# Patient Record
Sex: FEMALE | Race: WHITE | NOT HISPANIC OR LATINO | Employment: UNEMPLOYED | URBAN - NONMETROPOLITAN AREA
[De-identification: names, ages, dates, MRNs, and addresses within clinical notes are randomized per-mention and may not be internally consistent; named-entity substitution may affect disease eponyms.]

---

## 2017-06-02 ENCOUNTER — DOCUMENTATION (OUTPATIENT)
Dept: CARDIOLOGY | Facility: CLINIC | Age: 62
End: 2017-06-02

## 2017-06-02 ENCOUNTER — PREP FOR SURGERY (OUTPATIENT)
Dept: OTHER | Facility: HOSPITAL | Age: 62
End: 2017-06-02

## 2017-06-02 ENCOUNTER — OFFICE VISIT (OUTPATIENT)
Dept: CARDIOLOGY | Facility: CLINIC | Age: 62
End: 2017-06-02

## 2017-06-02 VITALS
SYSTOLIC BLOOD PRESSURE: 138 MMHG | BODY MASS INDEX: 41.14 KG/M2 | DIASTOLIC BLOOD PRESSURE: 82 MMHG | HEIGHT: 66 IN | OXYGEN SATURATION: 98 % | HEART RATE: 104 BPM | WEIGHT: 256 LBS

## 2017-06-02 DIAGNOSIS — E78.00 PURE HYPERCHOLESTEROLEMIA: ICD-10-CM

## 2017-06-02 DIAGNOSIS — I20.9 ANGINA PECTORIS (HCC): ICD-10-CM

## 2017-06-02 DIAGNOSIS — I10 ESSENTIAL HYPERTENSION: Primary | ICD-10-CM

## 2017-06-02 DIAGNOSIS — I20.0 ANGINA PECTORIS, UNSTABLE (HCC): Primary | ICD-10-CM

## 2017-06-02 PROCEDURE — 99204 OFFICE O/P NEW MOD 45 MIN: CPT | Performed by: INTERNAL MEDICINE

## 2017-06-02 PROCEDURE — 93000 ELECTROCARDIOGRAM COMPLETE: CPT | Performed by: INTERNAL MEDICINE

## 2017-06-02 RX ORDER — ASPIRIN 325 MG
325 TABLET ORAL DAILY
Qty: 30 TABLET | Refills: 11 | COMMUNITY
End: 2017-06-05 | Stop reason: HOSPADM

## 2017-06-02 RX ORDER — SODIUM CHLORIDE 0.9 % (FLUSH) 0.9 %
1-10 SYRINGE (ML) INJECTION AS NEEDED
Status: CANCELLED | OUTPATIENT
Start: 2017-06-05

## 2017-06-02 RX ORDER — ATORVASTATIN CALCIUM 20 MG/1
20 TABLET, FILM COATED ORAL DAILY
Qty: 30 TABLET | Refills: 11 | Status: SHIPPED | OUTPATIENT
Start: 2017-06-02

## 2017-06-02 RX ORDER — SODIUM CHLORIDE 9 MG/ML
80 INJECTION, SOLUTION INTRAVENOUS CONTINUOUS
Status: CANCELLED | OUTPATIENT
Start: 2017-06-05

## 2017-06-02 NOTE — PROGRESS NOTES
[facility]  OFFICE NOTE    Chasity Contreras  61 y.o. female    06/02/2017  1. Essential hypertension    2. Pure hypercholesterolemia    3. Angina pectoris        Chief complaint -Precordial chest pain radiating to neck and jaw and teeth      History of present Illness- 61-year-old lady who has been a diabetic for 3 years her most recent A1c was 10.5 has been having chest tightness radiating to the left arm with tingling and numbness and also to the jaw and to the teeth.  She does not have any cavities in the teeth.  She has been having a couple of episodes for the past 2 weeks and it is happening at rest.  She has not been taking aspirin.  She had questionable intolerance 40 years ago which made her heart rate going fast when she took aspirin.  I asked her to take an aspirin today.  If she starts having problems then stop it.  I discussed option of cardiac catheterization extreme risk of bleeding, stroke, heart attack and even death.  Patient consented patient is ASA class II, Mallamaptti level II patient consented for conscious sedation              Allergies   Allergen Reactions   • Aspirin    • Penicillins          Past Medical History:   Diagnosis Date   • Diabetes mellitus    • Hypertension    • Vertigo          Past Surgical History:   Procedure Laterality Date   • HYSTERECTOMY     • KNEE SURGERY           Family History   Problem Relation Age of Onset   • Heart disease Mother    • Hypertension Father          Social History     Social History   • Marital status:      Spouse name: N/A   • Number of children: N/A   • Years of education: N/A     Occupational History   • Not on file.     Social History Main Topics   • Smoking status: Never Smoker   • Smokeless tobacco: Never Used   • Alcohol use No   • Drug use: No   • Sexual activity: Defer     Other Topics Concern   • Not on file     Social History Narrative         Current Outpatient Prescriptions   Medication Sig Dispense  "Refill   • aspirin 325 MG tablet Take 1 tablet by mouth Daily. 30 tablet 11   • atorvastatin (LIPITOR) 20 MG tablet Take 1 tablet by mouth Daily. 30 tablet 11   • glyBURIDE (DIAbeta) 5 MG tablet Take 5 mg by mouth Daily With Breakfast.     • losartan-hydrochlorothiazide (HYZAAR) 100-25 MG per tablet Take 1 tablet by mouth Daily.     • metFORMIN (GLUCOPHAGE) 500 MG tablet Take 500 mg by mouth 2 (Two) Times a Day With Meals.       No current facility-administered medications for this visit.          Review of Systems     Constitution: Denies any fatigue, fever or chills    HENT: Denies any headache, hearing impairment,     Eyes: Denies any blurring of vision, or photophobia     Cardivascular - As per history of present illness     Respiratory system-denies any COPD, shortness of breath,   sleep apnea.     Endocrine:   history of hyperlipidemia, diabetes,                             Musculoskeletal:  No history of arthritis with musculoskeletal problems    Gastrointestinal: No nausea, vomiting, or melena    Genitourinary: No dysuria or hematuria    Neurological:   No history of seizure disorder, stroke, memory problems    Psychiatric/Behavioral:        No history of depression,  No history of bipolar disorder or schizophrenia     Hematological- no history of easy bruising or any bleeding diathesis            OBJECTIVE    /82  Pulse 104  Ht 66\" (167.6 cm)  Wt 256 lb (116 kg)  SpO2 98%  BMI 41.32 kg/m2      Physical Exam     Constitutional: is oriented to person, place, and time.     Skin-warm and dry    Well developed and nourished in no acute distress      Head: Normocephalic and atraumatic.     Eyes: Pupils are equal, round, and reactive to light.     Neck: Neck supple. No bruit in the carotids, no elevation of JVD    Cardiovascular: [place] in the fifth intercostal space   Regular rate, and  Rhythm,    S1 greater than S2, no S3 or S4, no gallop     Pulmonary/Chest:   Air  Entry is equal on both sides  No " wheezing or crackles,      Abdominal: Soft.  No hepatosplenomegaly, bowel sounds are present    Musculoskeletal: No kyphoscoliosis, no significant thickening of the joints    Neurological: is alert and oriented to person, place, and time.    cranial nerve are intact .   No motor or sensory deficit    Extremities-no edema, no radial femoral delay      Psychiatric: He has a normal mood and affect.                  His behavior is normal.             ECG 12 Lead  Date/Time: 6/2/2017 9:04 AM  Performed by: AARON CORTES  Authorized by: AARON CORTES   Comparison: not compared with previous ECG   Rhythm: sinus rhythm  Comments: Sinus rhythm low voltage, nonprogression of R-wave in V4 to V6 with small Q in lead 3              Lab Results   Component Value Date    WBC 10.9 (H) 04/14/2016    HGB 15.7 (H) 04/14/2016    HCT 45.9 (H) 04/14/2016    MCV 87.8 04/14/2016     04/14/2016     Lab Results   Component Value Date    GLUCOSE 231 (H) 04/14/2016    BUN 18 04/14/2016    CREATININE 0.7 04/14/2016    CO2 27 04/14/2016    CALCIUM 9.2 04/14/2016    ALBUMIN 4.5 04/14/2016    AST 49 (H) 04/14/2016    ALT 57 (H) 04/14/2016                  A/P    Unstable angina-with risk factors of diabetes, BMI of 41, hypertension with EKG showing sinus rhythm with the nonprogression of R-wave in V4 to V6 with recurrence of symptoms started on aspirin, nitrates and statins.  Schedule for cardiac catheterization Monday afternoon.  Explained the risk of bleeding stroke heart attack and patient consented.    Diabetes poorly controlled, A1c is 10.5 and she is getting hypoglycemia with the glyburide and the metformin has been on hold for 2 days prior to the cardiac catheterization will have to start her on new medications at a lower dose.    Hypertension controlled with Hyzaar.     spent 40 minutes                This document has been electronically signed by Aaron Cortes MD on June 2, 2017 9:01 AM       EMR  Dragon/Transcription disclaimer:   Some of this note may be an electronic transcription/translation of spoken language to printed text. The electronic translation of spoken language may permit erroneous, or at times, nonsensical words or phrases to be inadvertently transcribed; Although I have reviewed the note for such errors, some may still exist.

## 2017-06-02 NOTE — PROGRESS NOTES
Pt scheduled for heart cath on 6/5/17 at 2pm. Instructions given to pt with verbalization of understanding. Pt to have a light breakfast before 8am, take b/p medication, and stop metformin 2 days before procedure.

## 2017-06-05 ENCOUNTER — HOSPITAL ENCOUNTER (OUTPATIENT)
Facility: HOSPITAL | Age: 62
Setting detail: HOSPITAL OUTPATIENT SURGERY
Discharge: HOME | End: 2017-06-05
Attending: INTERNAL MEDICINE | Admitting: INTERNAL MEDICINE

## 2017-06-05 VITALS
SYSTOLIC BLOOD PRESSURE: 122 MMHG | WEIGHT: 256.62 LBS | DIASTOLIC BLOOD PRESSURE: 70 MMHG | OXYGEN SATURATION: 94 % | HEIGHT: 66 IN | BODY MASS INDEX: 41.24 KG/M2 | RESPIRATION RATE: 18 BRPM | TEMPERATURE: 98 F | HEART RATE: 88 BPM

## 2017-06-05 DIAGNOSIS — I20.0 ANGINA PECTORIS, UNSTABLE (HCC): ICD-10-CM

## 2017-06-05 LAB
ALBUMIN SERPL-MCNC: 4.1 G/DL (ref 3.4–4.8)
ALBUMIN/GLOB SERPL: 1.4 G/DL (ref 1.1–1.8)
ALP SERPL-CCNC: 57 U/L (ref 38–126)
ALT SERPL W P-5'-P-CCNC: 74 U/L (ref 9–52)
ANION GAP SERPL CALCULATED.3IONS-SCNC: 10 MMOL/L (ref 5–15)
AST SERPL-CCNC: 34 U/L (ref 14–36)
BILIRUB SERPL-MCNC: 0.4 MG/DL (ref 0.2–1.3)
BUN BLD-MCNC: 30 MG/DL (ref 7–21)
BUN/CREAT SERPL: 30 (ref 7–25)
CALCIUM SPEC-SCNC: 9 MG/DL (ref 8.4–10.2)
CHLORIDE SERPL-SCNC: 103 MMOL/L (ref 95–110)
CO2 SERPL-SCNC: 25 MMOL/L (ref 22–31)
CREAT BLD-MCNC: 1 MG/DL (ref 0.5–1)
DEPRECATED RDW RBC AUTO: 40.8 FL (ref 36.4–46.3)
ERYTHROCYTE [DISTWIDTH] IN BLOOD BY AUTOMATED COUNT: 12.7 % (ref 11.5–14.5)
GFR SERPL CREATININE-BSD FRML MDRD: 56 ML/MIN/1.73 (ref 60–104)
GLOBULIN UR ELPH-MCNC: 3 GM/DL (ref 2.3–3.5)
GLUCOSE BLD-MCNC: 190 MG/DL (ref 60–100)
HCT VFR BLD AUTO: 41.6 % (ref 35–45)
HGB BLD-MCNC: 13.7 G/DL (ref 12–15.5)
INR PPP: 0.96 (ref 0.8–1.2)
MCH RBC QN AUTO: 28.8 PG (ref 26.5–34)
MCHC RBC AUTO-ENTMCNC: 32.9 G/DL (ref 31.4–36)
MCV RBC AUTO: 87.6 FL (ref 80–98)
PLATELET # BLD AUTO: 310 10*3/MM3 (ref 150–450)
PMV BLD AUTO: 10.6 FL (ref 8–12)
POTASSIUM BLD-SCNC: 3.8 MMOL/L (ref 3.5–5.1)
PROT SERPL-MCNC: 7.1 G/DL (ref 6.3–8.6)
PROTHROMBIN TIME: 12.7 SECONDS (ref 11.1–15.3)
RBC # BLD AUTO: 4.75 10*6/MM3 (ref 3.77–5.16)
SODIUM BLD-SCNC: 138 MMOL/L (ref 137–145)
WBC NRBC COR # BLD: 8.56 10*3/MM3 (ref 3.2–9.8)

## 2017-06-05 PROCEDURE — 25010000002 MIDAZOLAM PER 1 MG: Performed by: INTERNAL MEDICINE

## 2017-06-05 PROCEDURE — 0 IOPAMIDOL PER 1 ML: Performed by: INTERNAL MEDICINE

## 2017-06-05 PROCEDURE — 93458 L HRT ARTERY/VENTRICLE ANGIO: CPT | Performed by: INTERNAL MEDICINE

## 2017-06-05 PROCEDURE — C1894 INTRO/SHEATH, NON-LASER: HCPCS | Performed by: INTERNAL MEDICINE

## 2017-06-05 PROCEDURE — C1760 CLOSURE DEV, VASC: HCPCS | Performed by: INTERNAL MEDICINE

## 2017-06-05 PROCEDURE — 25010000003 CEFAZOLIN PER 500 MG: Performed by: INTERNAL MEDICINE

## 2017-06-05 PROCEDURE — 25010000002 FENTANYL CITRATE (PF) 100 MCG/2ML SOLUTION: Performed by: INTERNAL MEDICINE

## 2017-06-05 PROCEDURE — 85027 COMPLETE CBC AUTOMATED: CPT | Performed by: INTERNAL MEDICINE

## 2017-06-05 PROCEDURE — 85610 PROTHROMBIN TIME: CPT | Performed by: INTERNAL MEDICINE

## 2017-06-05 PROCEDURE — 80053 COMPREHEN METABOLIC PANEL: CPT | Performed by: INTERNAL MEDICINE

## 2017-06-05 PROCEDURE — C1769 GUIDE WIRE: HCPCS | Performed by: INTERNAL MEDICINE

## 2017-06-05 RX ORDER — LIDOCAINE HYDROCHLORIDE 20 MG/ML
INJECTION, SOLUTION INFILTRATION; PERINEURAL AS NEEDED
Status: DISCONTINUED | OUTPATIENT
Start: 2017-06-05 | End: 2017-06-05 | Stop reason: HOSPADM

## 2017-06-05 RX ORDER — METOPROLOL TARTRATE 5 MG/5ML
INJECTION INTRAVENOUS AS NEEDED
Status: DISCONTINUED | OUTPATIENT
Start: 2017-06-05 | End: 2017-06-05 | Stop reason: HOSPADM

## 2017-06-05 RX ORDER — ONDANSETRON 4 MG/1
4 TABLET, ORALLY DISINTEGRATING ORAL EVERY 6 HOURS PRN
Status: DISCONTINUED | OUTPATIENT
Start: 2017-06-05 | End: 2017-06-05 | Stop reason: HOSPADM

## 2017-06-05 RX ORDER — MIDAZOLAM HYDROCHLORIDE 1 MG/ML
INJECTION INTRAMUSCULAR; INTRAVENOUS AS NEEDED
Status: DISCONTINUED | OUTPATIENT
Start: 2017-06-05 | End: 2017-06-05 | Stop reason: HOSPADM

## 2017-06-05 RX ORDER — SODIUM CHLORIDE 0.9 % (FLUSH) 0.9 %
1-10 SYRINGE (ML) INJECTION AS NEEDED
Status: DISCONTINUED | OUTPATIENT
Start: 2017-06-05 | End: 2017-06-05 | Stop reason: HOSPADM

## 2017-06-05 RX ORDER — ONDANSETRON 2 MG/ML
4 INJECTION INTRAMUSCULAR; INTRAVENOUS EVERY 6 HOURS PRN
Status: DISCONTINUED | OUTPATIENT
Start: 2017-06-05 | End: 2017-06-05 | Stop reason: HOSPADM

## 2017-06-05 RX ORDER — SODIUM CHLORIDE 9 MG/ML
80 INJECTION, SOLUTION INTRAVENOUS CONTINUOUS
Status: DISCONTINUED | OUTPATIENT
Start: 2017-06-05 | End: 2017-06-05 | Stop reason: HOSPADM

## 2017-06-05 RX ORDER — ONDANSETRON 4 MG/1
4 TABLET, FILM COATED ORAL EVERY 6 HOURS PRN
Status: DISCONTINUED | OUTPATIENT
Start: 2017-06-05 | End: 2017-06-05 | Stop reason: HOSPADM

## 2017-06-05 RX ORDER — FENTANYL CITRATE 50 UG/ML
INJECTION, SOLUTION INTRAMUSCULAR; INTRAVENOUS AS NEEDED
Status: DISCONTINUED | OUTPATIENT
Start: 2017-06-05 | End: 2017-06-05 | Stop reason: HOSPADM

## 2017-06-05 RX ORDER — SODIUM CHLORIDE 9 MG/ML
100 INJECTION, SOLUTION INTRAVENOUS CONTINUOUS
Status: DISCONTINUED | OUTPATIENT
Start: 2017-06-05 | End: 2017-06-05 | Stop reason: HOSPADM

## 2017-06-05 RX ADMIN — SODIUM CHLORIDE 80 ML/HR: 9 INJECTION, SOLUTION INTRAVENOUS at 12:34

## 2017-06-05 NOTE — DISCHARGE INSTRUCTIONS
Remove dressing in 24 hours, may shower once dressing is removed.    Do not soak in a bath tub, hot tub, or swimming pool for 2 weeks.

## 2017-06-05 NOTE — H&P (VIEW-ONLY)
[facility]  OFFICE NOTE    Chasity Contreras  61 y.o. female    06/02/2017  1. Essential hypertension    2. Pure hypercholesterolemia    3. Angina pectoris        Chief complaint -Precordial chest pain radiating to neck and jaw and teeth      History of present Illness- 61-year-old lady who has been a diabetic for 3 years her most recent A1c was 10.5 has been having chest tightness radiating to the left arm with tingling and numbness and also to the jaw and to the teeth.  She does not have any cavities in the teeth.  She has been having a couple of episodes for the past 2 weeks and it is happening at rest.  She has not been taking aspirin.  She had questionable intolerance 40 years ago which made her heart rate going fast when she took aspirin.  I asked her to take an aspirin today.  If she starts having problems then stop it.  I discussed option of cardiac catheterization extreme risk of bleeding, stroke, heart attack and even death.  Patient consented patient is ASA class II, Mallamaptti level II patient consented for conscious sedation              Allergies   Allergen Reactions   • Aspirin    • Penicillins          Past Medical History:   Diagnosis Date   • Diabetes mellitus    • Hypertension    • Vertigo          Past Surgical History:   Procedure Laterality Date   • HYSTERECTOMY     • KNEE SURGERY           Family History   Problem Relation Age of Onset   • Heart disease Mother    • Hypertension Father          Social History     Social History   • Marital status:      Spouse name: N/A   • Number of children: N/A   • Years of education: N/A     Occupational History   • Not on file.     Social History Main Topics   • Smoking status: Never Smoker   • Smokeless tobacco: Never Used   • Alcohol use No   • Drug use: No   • Sexual activity: Defer     Other Topics Concern   • Not on file     Social History Narrative         Current Outpatient Prescriptions   Medication Sig Dispense  "Refill   • aspirin 325 MG tablet Take 1 tablet by mouth Daily. 30 tablet 11   • atorvastatin (LIPITOR) 20 MG tablet Take 1 tablet by mouth Daily. 30 tablet 11   • glyBURIDE (DIAbeta) 5 MG tablet Take 5 mg by mouth Daily With Breakfast.     • losartan-hydrochlorothiazide (HYZAAR) 100-25 MG per tablet Take 1 tablet by mouth Daily.     • metFORMIN (GLUCOPHAGE) 500 MG tablet Take 500 mg by mouth 2 (Two) Times a Day With Meals.       No current facility-administered medications for this visit.          Review of Systems     Constitution: Denies any fatigue, fever or chills    HENT: Denies any headache, hearing impairment,     Eyes: Denies any blurring of vision, or photophobia     Cardivascular - As per history of present illness     Respiratory system-denies any COPD, shortness of breath,   sleep apnea.     Endocrine:   history of hyperlipidemia, diabetes,                             Musculoskeletal:  No history of arthritis with musculoskeletal problems    Gastrointestinal: No nausea, vomiting, or melena    Genitourinary: No dysuria or hematuria    Neurological:   No history of seizure disorder, stroke, memory problems    Psychiatric/Behavioral:        No history of depression,  No history of bipolar disorder or schizophrenia     Hematological- no history of easy bruising or any bleeding diathesis            OBJECTIVE    /82  Pulse 104  Ht 66\" (167.6 cm)  Wt 256 lb (116 kg)  SpO2 98%  BMI 41.32 kg/m2      Physical Exam     Constitutional: is oriented to person, place, and time.     Skin-warm and dry    Well developed and nourished in no acute distress      Head: Normocephalic and atraumatic.     Eyes: Pupils are equal, round, and reactive to light.     Neck: Neck supple. No bruit in the carotids, no elevation of JVD    Cardiovascular: [place] in the fifth intercostal space   Regular rate, and  Rhythm,    S1 greater than S2, no S3 or S4, no gallop     Pulmonary/Chest:   Air  Entry is equal on both sides  No " wheezing or crackles,      Abdominal: Soft.  No hepatosplenomegaly, bowel sounds are present    Musculoskeletal: No kyphoscoliosis, no significant thickening of the joints    Neurological: is alert and oriented to person, place, and time.    cranial nerve are intact .   No motor or sensory deficit    Extremities-no edema, no radial femoral delay      Psychiatric: He has a normal mood and affect.                  His behavior is normal.             ECG 12 Lead  Date/Time: 6/2/2017 9:04 AM  Performed by: AARON CORTES  Authorized by: AARON CORTES   Comparison: not compared with previous ECG   Rhythm: sinus rhythm  Comments: Sinus rhythm low voltage, nonprogression of R-wave in V4 to V6 with small Q in lead 3              Lab Results   Component Value Date    WBC 10.9 (H) 04/14/2016    HGB 15.7 (H) 04/14/2016    HCT 45.9 (H) 04/14/2016    MCV 87.8 04/14/2016     04/14/2016     Lab Results   Component Value Date    GLUCOSE 231 (H) 04/14/2016    BUN 18 04/14/2016    CREATININE 0.7 04/14/2016    CO2 27 04/14/2016    CALCIUM 9.2 04/14/2016    ALBUMIN 4.5 04/14/2016    AST 49 (H) 04/14/2016    ALT 57 (H) 04/14/2016                  A/P    Unstable angina-with risk factors of diabetes, BMI of 41, hypertension with EKG showing sinus rhythm with the nonprogression of R-wave in V4 to V6 with recurrence of symptoms started on aspirin, nitrates and statins.  Schedule for cardiac catheterization Monday afternoon.  Explained the risk of bleeding stroke heart attack and patient consented.    Diabetes poorly controlled, A1c is 10.5 and she is getting hypoglycemia with the glyburide and the metformin has been on hold for 2 days prior to the cardiac catheterization will have to start her on new medications at a lower dose.    Hypertension controlled with Hyzaar.     spent 40 minutes                This document has been electronically signed by Aaron Cortes MD on June 2, 2017 9:01 AM       EMR  Dragon/Transcription disclaimer:   Some of this note may be an electronic transcription/translation of spoken language to printed text. The electronic translation of spoken language may permit erroneous, or at times, nonsensical words or phrases to be inadvertently transcribed; Although I have reviewed the note for such errors, some may still exist.

## 2017-07-05 ENCOUNTER — OFFICE VISIT (OUTPATIENT)
Dept: CARDIOLOGY | Facility: CLINIC | Age: 62
End: 2017-07-05

## 2017-07-05 VITALS
WEIGHT: 258 LBS | DIASTOLIC BLOOD PRESSURE: 80 MMHG | HEIGHT: 66 IN | BODY MASS INDEX: 41.46 KG/M2 | SYSTOLIC BLOOD PRESSURE: 128 MMHG | HEART RATE: 100 BPM

## 2017-07-05 DIAGNOSIS — E11.9 TYPE 2 DIABETES MELLITUS WITHOUT COMPLICATION, WITHOUT LONG-TERM CURRENT USE OF INSULIN (HCC): Primary | ICD-10-CM

## 2017-07-05 DIAGNOSIS — I10 ESSENTIAL HYPERTENSION: ICD-10-CM

## 2017-07-05 DIAGNOSIS — E78.00 PURE HYPERCHOLESTEROLEMIA: ICD-10-CM

## 2017-07-05 PROCEDURE — 99212 OFFICE O/P EST SF 10 MIN: CPT | Performed by: INTERNAL MEDICINE

## 2017-07-05 RX ORDER — DILTIAZEM HYDROCHLORIDE 180 MG/1
180 CAPSULE, COATED, EXTENDED RELEASE ORAL DAILY
COMMUNITY

## 2017-07-05 NOTE — PROGRESS NOTES
[facility]  OFFICE PROGRESS NOTE    Name: Chasity Contreras  Age/Sex: 61 y.o. female  :  1955        PCP: AMANUEL Rodriguez    Vital Signs  Heart Rate:  [100] 100  BP: (128)/(80) 128/80  Body mass index is 41.64 kg/(m^2).      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  Reassured her doctor about diet and exercise and losing weight and better control of her sugars.      Patient Active Problem List   Diagnosis   • Vertigo   • Essential hypertension   • Pure hypercholesterolemia   • Angina pectoris   • Type 2 diabetes mellitus without complication, without long-term current use of insulin       Past Medical History:   Diagnosis Date   • Diabetes mellitus    • Hypertension    • Vertigo        Current Outpatient Prescriptions   Medication Sig Dispense Refill   • atorvastatin (LIPITOR) 20 MG tablet Take 1 tablet by mouth Daily. 30 tablet 11   • diltiaZEM CD (CARDIZEM CD) 180 MG 24 hr capsule Take 180 mg by mouth Daily.     • glyBURIDE (DIAbeta) 5 MG tablet Take 5 mg by mouth Daily With Breakfast.     • losartan-hydrochlorothiazide (HYZAAR) 100-25 MG per tablet Take 1 tablet by mouth Daily.     • metFORMIN (GLUCOPHAGE) 500 MG tablet Take 500 mg by mouth 2 (Two) Times a Day With Meals.       No current facility-administered medications for this visit.        Past Surgical History:   Procedure Laterality Date   • CARDIAC CATHETERIZATION N/A 2017    Procedure: Coronary angiography;  Surgeon: Duarte Thompson MD;  Location: [facility] INVASIVE LOCATION;  Service:    • DILATION AND CURETTAGE, DIAGNOSTIC / THERAPEUTIC     • HYSTERECTOMY     • KNEE SURGERY     • MENISCECTOMY         Social History     Social History   • Marital status:       Spouse name: N/A   • Number of children: N/A   • Years of education: N/A     Occupational History   • Not on file.     Social History Main Topics   • Smoking status: Never Smoker   • Smokeless tobacco: Never Used   • Alcohol use No   • Drug use: No   • Sexual activity: Defer     Other Topics Concern   • Not on file     Social History Narrative       O/E    Neck supple  no Jvd , no thyroid enlargement  Chest air entry equal, normal respiration   no rhonchi or creps  Cardiovascular system regular rate and rythem , no murmurs  Abdomen soft no tenderness, bowel sounds present, no hepatosplenomegaly.  CNS - alert , oriented to place and time  No motor or sensory deficit.  Cranial nerves intact  musculoskeletal no deformity of the back or spine   Extremeties  no edema ,   pulses equal on both side    Admission on 06/05/2017, Discharged on 06/05/2017   Component Date Value Ref Range Status   • WBC 06/05/2017 8.56  3.20 - 9.80 10*3/mm3 Final   • RBC 06/05/2017 4.75  3.77 - 5.16 10*6/mm3 Final   • Hemoglobin 06/05/2017 13.7  12.0 - 15.5 g/dL Final   • Hematocrit 06/05/2017 41.6  35.0 - 45.0 % Final   • MCV 06/05/2017 87.6  80.0 - 98.0 fL Final   • MCH 06/05/2017 28.8  26.5 - 34.0 pg Final   • MCHC 06/05/2017 32.9  31.4 - 36.0 g/dL Final   • RDW 06/05/2017 12.7  11.5 - 14.5 % Final   • RDW-SD 06/05/2017 40.8  36.4 - 46.3 fl Final   • MPV 06/05/2017 10.6  8.0 - 12.0 fL Final   • Platelets 06/05/2017 310  150 - 450 10*3/mm3 Final   • Glucose 06/05/2017 190* 60 - 100 mg/dL Final   • BUN 06/05/2017 30* 7 - 21 mg/dL Final   • Creatinine 06/05/2017 1.00  0.50 - 1.00 mg/dL Final   • Sodium 06/05/2017 138  137 - 145 mmol/L Final   • Potassium 06/05/2017 3.8  3.5 - 5.1 mmol/L Final   • Chloride 06/05/2017 103  95 - 110 mmol/L Final   • CO2 06/05/2017 25.0  22.0 - 31.0 mmol/L Final   • Calcium 06/05/2017 9.0  8.4 - 10.2 mg/dL Final   • Total Protein 06/05/2017 7.1  6.3 - 8.6 g/dL Final   • Albumin 06/05/2017 4.10  3.40 - 4.80 g/dL  Final   • ALT (SGPT) 06/05/2017 74* 9 - 52 U/L Final   • AST (SGOT) 06/05/2017 34  14 - 36 U/L Final   • Alkaline Phosphatase 06/05/2017 57  38 - 126 U/L Final   • Total Bilirubin 06/05/2017 0.4  0.2 - 1.3 mg/dL Final   • eGFR Non  Amer 06/05/2017 56* >60 mL/min/1.73 Final   • Globulin 06/05/2017 3.0  2.3 - 3.5 gm/dL Final   • A/G Ratio 06/05/2017 1.4  1.1 - 1.8 g/dL Final   • BUN/Creatinine Ratio 06/05/2017 30.0* 7.0 - 25.0 Final   • Anion Gap 06/05/2017 10.0  5.0 - 15.0 mmol/L Final   • Protime 06/05/2017 12.7  11.1 - 15.3 Seconds Final   • INR 06/05/2017 0.96  0.80 - 1.20 Final   ]    Procedures          A/P    Atypical chest pain-no CAD by cardiac catheterization reassured her, was factor modification with diabetes controlled, hypertension and hyperlipidemia controlled.    [id number] necessary            This document has been electronically signed by Duarte Thompson MD on July 5, 2017 1:16 PM         EMR Dragon/Transcription disclaimer:   Some of this note may be an electronic transcription/translation of spoken language to printed text. The electronic translation of spoken language may permit erroneous, or at times, nonsensical words or phrases to be inadvertently transcribed; Although I have reviewed the note for such errors, some may still exist.

## 2020-07-16 ENCOUNTER — NURSE TRIAGE (OUTPATIENT)
Dept: CALL CENTER | Facility: HOSPITAL | Age: 65
End: 2020-07-16

## 2020-07-16 NOTE — TELEPHONE ENCOUNTER
Going to urgent care.    Reason for Disposition  • Weak immune system (e.g., HIV positive, cancer chemo, splenectomy, organ transplant, chronic steroids)    Additional Information  • Negative: Shock suspected (e.g., cold/pale/clammy skin, too weak to stand, low BP, rapid pulse)  • Negative: Difficult to awaken or acting confused (e.g., disoriented, slurred speech)  • Negative: Sounds like a life-threatening emergency to the triager  • Negative: Vomiting also present and worse than the diarrhea  • Negative: [1] Blood in stool AND [2] without diarrhea  • Negative: Diarrhea in a cancer patient who is currently (or recently) receiving chemotherapy or radiation therapy, or cancer patient who has metastatic or end-stage cancer and is receiving palliative care  • Negative: [1] SEVERE abdominal pain (e.g., excruciating) AND [2] present > 1 hour  • Negative: [1] SEVERE abdominal pain AND [2] age > 60  • Negative: [1] Blood in the stool AND [2] moderate or large amount of blood  • Negative: Black or tarry bowel movements  (Exception: chronic-unchanged  black-grey bowel movements AND is taking iron pills or Pepto-bismol)  • Negative: [1] Drinking very little AND [2] dehydration suspected (e.g., no urine > 12 hours, very dry mouth, very lightheaded)  • Negative: Patient sounds very sick or weak to the triager  • Negative: [1] SEVERE diarrhea (e.g., 7 or more times / day more than normal) AND [2] age > 60 years  • Negative: [1] Constant abdominal pain AND [2] present > 2 hours  • Negative: [1] Fever > 103 F (39.4 C) AND [2] not able to get the fever down using Fever Care Advice  • Negative: [1] SEVERE diarrhea (e.g., 7 or more times / day more than normal) AND [2] present > 24 hours (1 day)  • Negative: [1] MODERATE diarrhea (e.g., 4-6 times / day more than normal) AND [2] present > 48 hours (2 days)  • Negative: [1] MODERATE diarrhea (e.g., 4-6 times / day more than normal) AND [2] age > 70 years  • Negative: Fever > 101 F  "(38.3 C)  • Negative: Fever present > 3 days (72 hours)  • Negative: Abdominal pain  (Exception: Pain clears with each passage of diarrhea stool)  • Negative: [1] Blood in the stool AND [2] small amount of blood   (Exception: only on toilet paper. Reason: diarrhea can cause rectal irritation with blood on wiping)  • Negative: [1] Mucus or pus in stool AND [2] present > 2 days AND [3] diarrhea is more than mild  • Negative: [1] Recent antibiotic therapy (i.e., within last 2 months) AND [2] diarrhea present > 3 days since antibiotic was stopped  • Negative: [1] Recent hospitalization AND [2] diarrhea present > 3 days    Answer Assessment - Initial Assessment Questions  1. DIARRHEA SEVERITY: \"How bad is the diarrhea?\" \"How many extra stools have you had in the past 24 hours than normal?\"     - NO DIARRHEA (SCALE 0)    - MILD (SCALE 1-3): Few loose or mushy BMs; increase of 1-3 stools over normal daily number of stools; mild increase in ostomy output.    -  MODERATE (SCALE 4-7): Increase of 4-6 stools daily over normal; moderate increase in ostomy output.  * SEVERE (SCALE 8-10; OR 'WORST POSSIBLE'): Increase of 7 or more stools daily over normal; moderate increase in ostomy output; incontinence.  mild  2. ONSET: \"When did the diarrhea begin?\"       Since was quarantine  3. BM CONSISTENCY: \"How loose or watery is the diarrhea?\"       Watery and lose  4. VOMITING: \"Are you also vomiting?\" If so, ask: \"How many times in the past 24 hours?\"       no  5. ABDOMINAL PAIN: \"Are you having any abdominal pain?\" If yes: \"What does it feel like?\" (e.g., crampy, dull, intermittent, constant)       curry  6. ABDOMINAL PAIN SEVERITY: If present, ask: \"How bad is the pain?\"  (e.g., Scale 1-10; mild, moderate, or severe)    - MILD (1-3): doesn't interfere with normal activities, abdomen soft and not tender to touch     - MODERATE (4-7): interferes with normal activities or awakens from sleep, tender to touch     - SEVERE (8-10): " "excruciating pain, doubled over, unable to do any normal activities        no  7. ORAL INTAKE: If vomiting, \"Have you been able to drink liquids?\" \"How much fluids have you had in the past 24 hours?\"      Able to drink  8. HYDRATION: \"Any signs of dehydration?\" (e.g., dry mouth [not just dry lips], too weak to stand, dizziness, new weight loss) \"When did you last urinate?\"      Think may be dehydrated, 9-10 # weight loss  9. EXPOSURE: \"Have you traveled to a foreign country recently?\" \"Have you been exposed to anyone with diarrhea?\" \"Could you have eaten any food that was spoiled?\"      no  10. ANTIBIOTIC USE: \"Are you taking antibiotics now or have you taken antibiotics in the past 2 months?\"        no  11. OTHER SYMPTOMS: \"Do you have any other symptoms?\" (e.g., fever, blood in stool)        COVID +  12. PREGNANCY: \"Is there any chance you are pregnant?\" \"When was your last menstrual period?\"        na    Protocols used: DIARRHEA-ADULT-AH      "

## 2021-03-30 ENCOUNTER — IMMUNIZATION (OUTPATIENT)
Dept: VACCINE CLINIC | Facility: HOSPITAL | Age: 66
End: 2021-03-30

## 2021-03-30 PROCEDURE — 91300 HC SARSCOV02 VAC 30MCG/0.3ML IM: CPT | Performed by: NURSE PRACTITIONER

## 2021-03-30 PROCEDURE — 0001A: CPT | Performed by: NURSE PRACTITIONER

## 2021-04-20 ENCOUNTER — IMMUNIZATION (OUTPATIENT)
Dept: VACCINE CLINIC | Facility: HOSPITAL | Age: 66
End: 2021-04-20

## 2021-04-20 PROCEDURE — 0002A: CPT | Performed by: THORACIC SURGERY (CARDIOTHORACIC VASCULAR SURGERY)

## 2021-04-20 PROCEDURE — 91300 HC SARSCOV02 VAC 30MCG/0.3ML IM: CPT | Performed by: THORACIC SURGERY (CARDIOTHORACIC VASCULAR SURGERY)

## (undated) DEVICE — ANGIO-SEAL VIP VASCULAR CLOSURE DEVICE: Brand: ANGIO-SEAL

## (undated) DEVICE — ELECTRODE,RT,STRESS,FOAM,50PK: Brand: MEDLINE

## (undated) DEVICE — PK CATH LAB 60

## (undated) DEVICE — ST CVR PROB PULLUP ULTRASND 5X48IN

## (undated) DEVICE — CATH F6 ST JL 4 100CM: Brand: SUPERTORQUE

## (undated) DEVICE — VSI MICRO-INTRODUCER KITS ARE INTENDED FOR USE IN PERCUTANEOUS INTRODUCTION OF UP TO A 0.018 INCH OR 0.038 INCH GUIDEWIRE OR CATHETER INTO THE VASCULAR SYSTEM FOLLOWING A SMALL GAUGE NEEDLE STICK.: Brand: VSI MICRO-INTRODUCER KIT

## (undated) DEVICE — MODEL AT P65, P/N 701554-001KIT CONTENTS: HAND CONTROLLER, 3-WAY HIGH-PRESSURE STOPCOCK WITH ROTATING END AND PREMIUM HIGH-PRESSURE TUBING: Brand: ANGIOTOUCH® KIT

## (undated) DEVICE — MODEL BT2000 P/N 700287-012KIT CONTENTS: MANIFOLD WITH SALINE AND CONTRAST PORTS, SALINE TUBING WITH SPIKE AND HAND SYRINGE, TRANSDUCER: Brand: BT2000 AUTOMATED MANIFOLD KIT

## (undated) DEVICE — INTRO SHEATH ART/FEM ENGAGE .038 6F12CM

## (undated) DEVICE — CATH F6 ST JR 4 100CM: Brand: SUPERTORQUE

## (undated) DEVICE — GW PERIPH GUIDERIGHT STD/J/TP PTFE/PCOAT SS 0.038IN 5X150CM

## (undated) DEVICE — CATH DIAG EXPO .052 PIG 6F 110CM